# Patient Record
Sex: MALE | Race: WHITE | NOT HISPANIC OR LATINO | Employment: FULL TIME | URBAN - METROPOLITAN AREA
[De-identification: names, ages, dates, MRNs, and addresses within clinical notes are randomized per-mention and may not be internally consistent; named-entity substitution may affect disease eponyms.]

---

## 2018-03-03 ENCOUNTER — HOSPITAL ENCOUNTER (EMERGENCY)
Facility: CLINIC | Age: 48
Discharge: HOME | End: 2018-03-03
Attending: EMERGENCY MEDICINE | Admitting: EMERGENCY MEDICINE
Payer: COMMERCIAL

## 2018-03-03 ENCOUNTER — APPOINTMENT (OUTPATIENT)
Dept: GENERAL RADIOLOGY | Facility: CLINIC | Age: 48
End: 2018-03-03
Attending: EMERGENCY MEDICINE
Payer: COMMERCIAL

## 2018-03-03 VITALS
SYSTOLIC BLOOD PRESSURE: 135 MMHG | HEART RATE: 90 BPM | DIASTOLIC BLOOD PRESSURE: 84 MMHG | OXYGEN SATURATION: 95 % | RESPIRATION RATE: 16 BRPM | HEIGHT: 75 IN | TEMPERATURE: 97.7 F

## 2018-03-03 DIAGNOSIS — M25.511 ACUTE PAIN OF RIGHT SHOULDER: ICD-10-CM
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
## 2018-03-03 PROCEDURE — 96374 THER/PROPH/DIAG INJ IV PUSH: CPT

## 2018-03-03 PROCEDURE — 96375 TX/PRO/DX INJ NEW DRUG ADDON: CPT

## 2018-03-03 PROCEDURE — 99284 EMERGENCY DEPT VISIT MOD MDM: CPT | Mod: 25

## 2018-03-03 PROCEDURE — 73030 X-RAY EXAM OF SHOULDER: CPT | Mod: RT

## 2018-03-03 PROCEDURE — 99284 EMERGENCY DEPT VISIT MOD MDM: CPT | Mod: Z6 | Performed by: EMERGENCY MEDICINE

## 2018-03-03 PROCEDURE — 25000128 H RX IP 250 OP 636: Performed by: EMERGENCY MEDICINE

## 2018-03-03 RX ORDER — HYDROMORPHONE HYDROCHLORIDE 1 MG/ML
0.5 INJECTION, SOLUTION INTRAMUSCULAR; INTRAVENOUS; SUBCUTANEOUS EVERY 30 MIN PRN
Status: DISCONTINUED | OUTPATIENT
Start: 2018-03-03 | End: 2018-03-04 | Stop reason: HOSPADM

## 2018-03-03 RX ORDER — HYDROCODONE BITARTRATE AND ACETAMINOPHEN 5; 325 MG/1; MG/1
1 TABLET ORAL EVERY 4 HOURS PRN
Qty: 7 TABLET | Refills: 0 | Status: SHIPPED | OUTPATIENT
Start: 2018-03-03 | End: 2018-06-27

## 2018-03-03 RX ORDER — ONDANSETRON 2 MG/ML
4 INJECTION INTRAMUSCULAR; INTRAVENOUS
Status: COMPLETED | OUTPATIENT
Start: 2018-03-03 | End: 2018-03-03

## 2018-03-03 RX ADMIN — Medication 0.5 MG: at 20:38

## 2018-03-03 RX ADMIN — ONDANSETRON 4 MG: 2 INJECTION INTRAMUSCULAR; INTRAVENOUS at 20:38

## 2018-03-03 ASSESSMENT — ENCOUNTER SYMPTOMS
CARDIOVASCULAR NEGATIVE: 1
RESPIRATORY NEGATIVE: 1
NEUROLOGICAL NEGATIVE: 1
HEMATOLOGIC/LYMPHATIC NEGATIVE: 1
ENDOCRINE NEGATIVE: 1
GASTROINTESTINAL NEGATIVE: 1
CONSTITUTIONAL NEGATIVE: 1
ALLERGIC/IMMUNOLOGIC NEGATIVE: 1
EYES NEGATIVE: 1
PSYCHIATRIC NEGATIVE: 1

## 2018-03-03 NOTE — ED AVS SNAPSHOT
[facility] Emergency Department    [address] [address]    Phone:  [phone]    Fax:  [phone]                                       Hayden Peterson   MRN: [id number]    Department:  [facility] Emergency Department   Date of Visit:  3/3/2018           Patient Information     Date Of Birth          1970        Your diagnoses for this visit were:     Fall, initial encounter tripped on garbage bag at home    Acute pain of right shoulder Suspect shoulder contusion cannot exclude fracture x-rays negative       You were seen by Valente Nunez MD.      Follow-up Information     Follow up with [facility], [facility]    Why:  if persisten pain and discomfort, You may need re-peat XR imaging or additional imaging to exclude a fracture of the scapula (consider CT imaging)    Contact information:    [address] [facility] [address]  [phone]        Discharge References/Attachments     HYDROCODONE BITARTRATE, ACETAMINOPHEN ORAL TABLET (ENGLISH)    UPPER EXTREMITY CONTUSION (ENGLISH)    CONTUSION, SHOULDER (ENGLISH)      24 Hour Appointment Hotline       To make an appointment at any [facility] call [phone] ([phone]). If you don't have a family doctor or clinic, we will help you find one. [facility] clinics are conveniently located to serve the needs of you and your family.          ED Discharge Orders     Shoulder Immobilizer, contoured                    Review of your medicines      START taking        Dose / Directions Last dose taken    HYDROcodone-acetaminophen 5-325 MG per tablet   Commonly known as:  NORCO   Dose:  1 tablet   Quantity:  7 tablet        Take 1 tablet by mouth every 4 hours as needed   Refills:  0                Prescriptions were sent or printed at these locations (1 Prescription)                   Other Prescriptions                Printed at Department/Unit printer (1 of 1)         HYDROcodone-acetaminophen (NORCO) 5-325 MG  "per tablet                Procedures and tests performed during your visit     XR Shoulder Right 2 Views      Orders Needing Specimen Collection     None      Pending Results     No orders found from 3/1/2018 to 3/4/2018.            Pending Culture Results     No orders found from 3/1/2018 to 3/4/2018.            Pending Results Instructions     If you had any lab results that were not finalized at the time of your Discharge, you can call the ED Lab Result RN at [phone]. You will be contacted by this team for any positive Lab results or changes in treatment. The nurses are available 7 days a week from 10A to 6:30P.  You can leave a message 24 hours per day and they will return your call.        Test Results From Your Hospital Stay        3/3/2018  9:44 PM      Narrative     XR SHOULDER 2 VIEW RIGHT   3/3/2018 9:35 PM     HISTORY: Rt Shoulder tenderness;     COMPARISON: None.        Impression     IMPRESSION: No evidence of acute fracture or subluxation. Humeral head  is properly located in the glenoid fossa. Mild acromioclavicular  degenerative changes.    JOYCELYN MIRANDA MD                Thank you for choosing [facility]       Thank you for choosing [facility] for your care. Our goal is always to provide you with excellent care. Hearing back from our patients is one way we can continue to improve our services. Please take a few minutes to complete the written survey that you may receive in the mail after you visit with us. Thank you!        [email or web address] Information     [employer] lets you send messages to your doctor, view your test results, renew your prescriptions, schedule appointments and more. To sign up, go to [email or web address] . Click on \"Log in\" on the left side of the screen, which will take you to the Welcome page. Then click on \"Sign up Now\" on the right side of the page.     You will be asked to enter the access code listed below, as well as some personal information. Please follow the directions to " create your username and password.     Your access code is: [email or web address]  Expires: 2018 10:08 PM     Your access code will  in 90 days. If you need help or a new code, please call your [facility] clinic or [phone].        Care EveryWhere ID     This is your Care EveryWhere ID. This could be used by other organizations to access your [facility] medical records  [id number]        Equal Access to Services     [facility] Center, Memorial CampusBERNADETTE : Hadii nissa chavez hadasho Soomaali, waaxda luqadaha, qaybta kaalmada adeegyada, chante whitley . So [facility] [phone].    ATENCIÓN: Si habla español, tiene a becerra disposición servicios gratuitos de asistencia lingüística. Llame al [phone].    We comply with applicable federal civil rights laws and [place] laws. We do not discriminate on the basis of race, color, national origin, age, disability, sex, sexual orientation, or gender identity.            After Visit Summary       This is your record. Keep this with you and show to your community pharmacist(s) and doctor(s) at your next visit.

## 2018-03-03 NOTE — ED AVS SNAPSHOT
[facility] Emergency Department    [address] [address]    Phone:  [phone]    Fax:  [phone]                                       Hayden Peterson   MRN: [id number]    Department:  [facility] Emergency Department   Date of Visit:  3/3/2018           After Visit Summary Signature Page     I have received my discharge instructions, and my questions have been answered. I have discussed any challenges I see with this plan with the nurse or doctor.    ..........................................................................................................................................  Patient/Patient Representative Signature      ..........................................................................................................................................  Patient Representative Print Name and Relationship to Patient    ..................................................               ................................................  Date                                            Time    ..........................................................................................................................................  Reviewed by Signature/Title    ...................................................              ..............................................  Date                                                            Time

## 2018-03-04 NOTE — ED PROVIDER NOTES
History     Chief Complaint   Patient presents with     Shoulder Injury     HPI  Hayden Peterson is a 47 year old male who presents the ED with right shoulder pain after a fall. The patient reports he tripped on a trash can when walking outside of his house and fell on his right shoulder and elbow. The patient reports taking citalopram 25 MG daily.  Reports no allergies to medications.  Patient reports he did not hit his head.  He also reports he has no neck pain he denies any back pain he has no abdominal pain no hip pain.  No prior history of injury to his shoulder no prior history of surgeries to his shoulder.     Social History: The patient presents to the ED with his wife. They are from [place], [place].     Past Medical History:  Past Medical History:   Diagnosis Date     NO ACTIVE PROBLEMS        Medications:  Current Outpatient Prescriptions   Medication Sig Dispense Refill     HYDROcodone-acetaminophen (NORCO) 5-325 MG per tablet Take 1 tablet by mouth every 4 hours as needed 7 tablet 0       Allergies:  Allergies   Allergen Reactions     No Known Drug Allergies      Problem List:    Patient Active Problem List    Diagnosis Date Noted     CARDIOVASCULAR SCREENING; LDL GOAL LESS THAN 160 10/31/2010     Priority: Medium     NO ACTIVE PROBLEMS 05/19/2004     Priority: Medium        Past Medical History:    Past Medical History:   Diagnosis Date     NO ACTIVE PROBLEMS        Past Surgical History:    Past Surgical History:   Procedure Laterality Date     NO HISTORY OF SURGERY         Family History:    Family History   Problem Relation Age of Onset     Family History Negative         Social History:  Marital Status:   [2]  Social History   Substance Use Topics     Smoking status: Never Smoker     Smokeless tobacco: Current User     Alcohol use 2.0 oz/week      Comment: occ.        Medications:      HYDROcodone-acetaminophen (NORCO) 5-325 MG per tablet         Review of Systems   Constitutional: Negative.   "  HENT: Negative.    Eyes: Negative.    Respiratory: Negative.    Cardiovascular: Negative.    Gastrointestinal: Negative.    Endocrine: Negative.    Genitourinary: Negative.    Musculoskeletal:        Right shoulder pain and discomfort.   Skin: Negative.    Allergic/Immunologic: Negative.    Neurological: Negative.    Hematological: Negative.    Psychiatric/Behavioral: Negative.    All other systems reviewed and are negative.      Physical Exam   BP: (!) 147/111  Pulse: 90  Temp: 97.7  F (36.5  C)  Resp: 16  Height: 190.5 cm (6' 3\")  SpO2: 98 %      Physical Exam   Constitutional: He is oriented to person, place, and time. He appears well-developed and well-nourished. No distress.   HENT:   Head: Normocephalic and atraumatic.   Eyes: Conjunctivae and EOM are normal. Pupils are equal, round, and reactive to light. Right eye exhibits no discharge. Left eye exhibits no discharge. No scleral icterus.   Neck: Normal range of motion. Neck supple. No JVD present. No tracheal deviation present. No thyromegaly present.   Cardiovascular: Normal rate and regular rhythm.  Exam reveals no gallop and no friction rub.    No murmur heard.  Pulmonary/Chest: Effort normal and breath sounds normal. No stridor.   Abdominal: Soft.   Musculoskeletal: He exhibits tenderness.        Right shoulder: He exhibits decreased range of motion, tenderness, bony tenderness and pain. He exhibits no swelling, no effusion, no crepitus, no deformity, no spasm, normal pulse and normal strength.        Arms:  Lymphadenopathy:     He has no cervical adenopathy.   Neurological: He is alert and oriented to person, place, and time. No cranial nerve deficit. Coordination normal.   Skin: No rash noted. He is not diaphoretic. No erythema. No pallor.   Psychiatric: He has a normal mood and affect. His behavior is normal. Judgment and thought content normal.       ED Course     ED Course     Procedures               Critical Care time:            Labs Ordered " and Resulted from Time of ED Arrival Up to the Time of Departure from the ED - No data to display          ED Medications:  Medications   HYDROmorphone (PF) (DILAUDID) injection 0.5 mg (0.5 mg Intravenous Given 3/3/18 2038)   ondansetron (ZOFRAN) injection 4 mg (4 mg Intravenous Given 3/3/18 2038)       ED Vitals:  Vitals:    03/03/18 2042 03/03/18 2045 03/03/18 2100 03/03/18 2115   BP: 135/84      Pulse:       Resp:       Temp:       TempSrc:       SpO2: 96% 96% 96% 95%   Height:           ED Labs and Imaging:  Results for orders placed or performed during the hospital encounter of 03/03/18 (from the past 24 hour(s))   XR Shoulder Right 2 Views    Narrative    XR SHOULDER 2 VIEW RIGHT   3/3/2018 9:35 PM     HISTORY: Rt Shoulder tenderness;     COMPARISON: None.      Impression    IMPRESSION: No evidence of acute fracture or subluxation. Humeral head  is properly located in the glenoid fossa. Mild acromioclavicular  degenerative changes.    JOYCELYN MIRANDA MD     8:16 PM    ED medications:  Medications   HYDROmorphone (PF) (DILAUDID) injection 0.5 mg (0.5 mg Intravenous Given 3/3/18 2038)   ondansetron (ZOFRAN) injection 4 mg (4 mg Intravenous Given 3/3/18 2038)         ED Vitals:  Vitals:    03/03/18 2042 03/03/18 2045 03/03/18 2100 03/03/18 2115   BP: 135/84      Pulse:       Resp:       Temp:       TempSrc:       SpO2: 96% 96% 96% 95%   Height:         Assessments & Plan (with Medical Decision Making)   Clinical impression: 47-year-old male right-hand-dominant who presented for right shoulder injury after mechanical fall at his home.  As a shoulder contusion x-ray did not show any fractures however advanced imaging may be needed to exclude any subtle scapular fracture injury.   He reports his wife had garbage out in the landing when he lost his footing and fell onto his right shoulder.  Patient arrived about half an hour after his mechanical fall.  He is seen on arrival for concern for shoulder injury in the context  of blunt trauma.  He reports no allergies to medicines and takes citalopram.  He works as a gun salesman.  On exam he has reproducible pain and discomfort to palpation about the posterior shoulder over the scapula without any gross deformity. He has limited range of motion with internal and external rotation of his right shoulder. There is no bruising about the trunk or chest wall.  No neck pain and no head injury. GCS is 15    ED course and plan:  With mechanism of injury and to aid comfort IV was established was given IV Dilaudid and Zofran.  An x-ray of his right shoulder was obtained.  X-ray was reviewed independently.  Please see the interpreting radiologist report above.  Patient was reevaluated after pain control x-ray imaging.  He continued have reproducible pain over the AC joint and upper pole of his right scapula.  We discussed that he could have a scapula fracture that could be missed on x-ray.  He was willing to try supportive care and expectant care with immobilization ice the shoulder and taking Vicodin for pain over the next 2-3 days.  If he develops persistent pain or discomfort he may need to have CT imaging to exclude a fracture.  He did not have any paresthesias or extremity weakness and did not complain of any neck pain and I felt it was reasonable to discharge him home with supportive care without advanced imaging.  Patient was comfortable plan of care        Disclaimer: This note consists of symbols derived from keyboarding, dictation and/or voice recognition software. As a result, there may be errors in the script that have gone undetected. Please consider this when interpreting information found in this chart.  I have reviewed the nursing notes.    I have reviewed the findings, diagnosis, plan and need for follow up with the patient.     Discharge Medication List as of 3/3/2018 10:08 PM      START taking these medications    Details   HYDROcodone-acetaminophen (NORCO) 5-325 MG per tablet  Take 1 tablet by mouth every 4 hours as needed, Disp-7 tablet, R-0, Local Print             Final diagnoses:   Fall, initial encounter - tripped on garbage bag at home   Acute pain of right shoulder - Suspect shoulder contusion cannot exclude fracture x-rays negative   This document serves as a record of the services and decisions personally performed and made by Valente Nunez, *.The HPI was created on his behalf by   Cary Whitfield, a trained medical scribe. The creation of this document is based the provider's statements to the medical scribe.  Cary Whitfield 8:17 PM 3/3/2018    Provider:   The information in this document, created by the medical scribe for me, accurately reflects the services I personally performed and the decisions made by me. I have reviewed and approved this document for accuracy prior to leaving the patient care area.  Valente Nunez, * 8:17 PM 3/3/2018      3/3/2018   [facility] EMERGENCY DEPARTMENT     Valente Nunez MD  03/03/18 2637

## 2018-06-27 ENCOUNTER — HOSPITAL ENCOUNTER (EMERGENCY)
Facility: CLINIC | Age: 48
Discharge: HOME | End: 2018-06-27
Attending: EMERGENCY MEDICINE | Admitting: EMERGENCY MEDICINE
Payer: COMMERCIAL

## 2018-06-27 VITALS
SYSTOLIC BLOOD PRESSURE: 161 MMHG | HEIGHT: 76 IN | RESPIRATION RATE: 18 BRPM | BODY MASS INDEX: 33.49 KG/M2 | TEMPERATURE: 100 F | OXYGEN SATURATION: 97 % | HEART RATE: 99 BPM | WEIGHT: 275 LBS | DIASTOLIC BLOOD PRESSURE: 104 MMHG

## 2018-06-27 DIAGNOSIS — J02.0 ACUTE STREPTOCOCCAL PHARYNGITIS: ICD-10-CM

## 2018-06-27 LAB
DEPRECATED S PYO AG THROAT QL EIA: ABNORMAL
SPECIMEN SOURCE: ABNORMAL

## 2018-06-27 PROCEDURE — 99284 EMERGENCY DEPT VISIT MOD MDM: CPT | Mod: Z6 | Performed by: EMERGENCY MEDICINE

## 2018-06-27 PROCEDURE — 87880 STREP A ASSAY W/OPTIC: CPT | Performed by: EMERGENCY MEDICINE

## 2018-06-27 PROCEDURE — 99283 EMERGENCY DEPT VISIT LOW MDM: CPT | Performed by: EMERGENCY MEDICINE

## 2018-06-27 RX ORDER — AMOXICILLIN 500 MG/1
1000 CAPSULE ORAL 2 TIMES DAILY
Qty: 40 CAPSULE | Refills: 0 | Status: SHIPPED | OUTPATIENT
Start: 2018-06-27 | End: 2018-07-07

## 2018-06-27 NOTE — ED AVS SNAPSHOT
[facility] Emergency Department    [address] [address]    Phone:  [phone]    Fax:  [phone]                                       Hayden Peterson   MRN: [id number]    Department:  [facility] Emergency Department   Date of Visit:  6/27/2018           After Visit Summary Signature Page     I have received my discharge instructions, and my questions have been answered. I have discussed any challenges I see with this plan with the nurse or doctor.    ..........................................................................................................................................  Patient/Patient Representative Signature      ..........................................................................................................................................  Patient Representative Print Name and Relationship to Patient    ..................................................               ................................................  Date                                            Time    ..........................................................................................................................................  Reviewed by Signature/Title    ...................................................              ..............................................  Date                                                            Time

## 2018-06-27 NOTE — ED AVS SNAPSHOT
[facility] Emergency Department    [address] [address]    Phone:  [phone]    Fax:  [phone]                                       Hayden Peterson   MRN: [id number]    Department:  [facility] Emergency Department   Date of Visit:  6/27/2018           Patient Information     Date Of Birth          1970        Your diagnoses for this visit were:     Acute streptococcal pharyngitis        You were seen by Yrn Aguayo MD.      Follow-up Information     Follow up with [facility]    Why:  As needed    [facility]:    [address] [id number] [phone]        Discharge References/Attachments     PHARYNGITIS, STREP (CONFIRMED) (ENGLISH)      24 Hour Appointment Hotline       To make an appointment at any [facility], call [phone] ([phone]). If you don't have a family doctor or clinic, we will help you find one. [facility] clinics are conveniently located to serve the needs of you and your family.             Review of your medicines      START taking        Dose / Directions Last dose taken    amoxicillin 500 MG capsule   Commonly known as:  AMOXIL   Dose:  1000 mg   Quantity:  40 capsule        Take 2 capsules (1,000 mg) by mouth 2 times daily for 10 days   Refills:  0          Our records show that you are taking the medicines listed below. If these are incorrect, please call your family doctor or clinic.        Dose / Directions Last dose taken    CITALOPRAM HYDROBROMIDE PO   Dose:  20 mg        Take 20 mg by mouth daily   Refills:  0                Prescriptions were sent or printed at these locations (1 Prescription)                   [facility] [address]   [address] [address]    Telephone:  [phone]   Fax:  [phone]   Hours:                  E-Prescribed (1 of 1)         amoxicillin (AMOXIL) 500 MG capsule                Procedures and tests performed  "during your visit     Rapid Strep Screen      Orders Needing Specimen Collection     None      Pending Results     No orders found from 2018 to 2018.            Pending Culture Results     No orders found from 2018 to 2018.            Pending Results Instructions     If you had any lab results that were not finalized at the time of your Discharge, you can call the ED Lab Result RN at [phone]. You will be contacted by this team for any positive Lab results or changes in treatment. The nurses are available 7 days a week from 10A to 6:30P.  You can leave a message 24 hours per day and they will return your call.        Test Results From Your Hospital Stay        2018  7:39 PM      Component Results     Component    Specimen Description    Throat    Rapid Strep A Screen (Abnormal)    POSITIVE: Group A Streptococcal antigen detected by immunoassay.                Thank you for choosing [facility]       Thank you for choosing [facility] for your care. Our goal is always to provide you with excellent care. Hearing back from our patients is one way we can continue to improve our services. Please take a few minutes to complete the written survey that you may receive in the mail after you visit with us. Thank you!        [employer] Information     [employer] lets you send messages to your doctor, view your test results, renew your prescriptions, schedule appointments and more. To sign up, go to [email or web address] . Click on \"Log in\" on the left side of the screen, which will take you to the Welcome page. Then click on \"Sign up Now\" on the right side of the page.     You will be asked to enter the access code listed below, as well as some personal information. Please follow the directions to create your username and password.     Your access code is: [email or web address]  Expires: 2018  7:48 PM     Your access code will  in 90 days. If you need help or a new code, please call your [facility] clinic or " [phone].        Care EveryWhere ID     This is your Care EveryWhere ID. This could be used by other organizations to access your [facility] medical records  [id number]        Equal Access to Services     KATHARINA MERA : Jen Wade, sofía botello, qaluz maria kazahidamadelaine king, chante horton. So [facility] [phone].    ATENCIÓN: Si habla español, tiene a becerra disposición servicios gratuitos de asistencia lingüística. Llame al [phone].    We comply with applicable federal civil rights laws and [place] laws. We do not discriminate on the basis of race, color, national origin, age, disability, sex, sexual orientation, or gender identity.            After Visit Summary       This is your record. Keep this with you and show to your community pharmacist(s) and doctor(s) at your next visit.

## 2018-06-28 NOTE — ED NOTES
"Pt c/o sore throat last few days and today also felt \"dizzy and almost buzzed feeling.\"  Pt states he cut out caffeine 2 days ago and feels that has something to do with the way he is feeling.  Pt used to have 4-5 cokes and 2-3 40 oz coffees a day.  Pt also c/o ha.  Pt did have a coke around 2:30 today that helped with the symptoms.  Pt denies any other symptoms.  Pt A&O x3.  = and strong in all extremities.  "

## 2018-06-28 NOTE — ED PROVIDER NOTES
"  History     Chief Complaint   Patient presents with     Dizziness     started today, c/o HA. states thinking feels foggy. c/o sore throat also     HPI  Hayden Peterson is a 47 year old male who presents with complaints of headache, a foggy sensation, dizziness which he describes as a \"buzzed sensation\".  He states he has not been drinking alcohol however.  He is also complained of a mild left-sided sore throat but has no problems with speech or swallowing.  Low-grade temperature here in triage but patient denies fever or chills at home.  Patient admits he stopped drinking caffeine 2 days ago.  Prior to that he would have at least 6 caffeinated beverages daily.  He states yesterday was not bad but today he had a pretty significant headache and had trouble concentrating.  After he drank two [employer]'s symptoms have improved and almost resolved.  Denies history of vertigo.  No history of TIA or stroke.  Denies any focal motor weakness or new sensory changes.  He has 3 children but is unsure if he has been exposed to strep.  He has had no skin rashes.  No abdominal complaints, nausea or vomiting.  No diarrhea or dysuria.  Other than the caffeinated beverages no treatment prior to arrival.  Patient does chew tobacco.  Patient does have seasonal allergies but does not take any medicine for this.    Problem List:    Patient Active Problem List    Diagnosis Date Noted     CARDIOVASCULAR SCREENING; LDL GOAL LESS THAN 160 10/31/2010     Priority: Medium     NO ACTIVE PROBLEMS 05/19/2004     Priority: Medium        Past Medical History:    Past Medical History:   Diagnosis Date     NO ACTIVE PROBLEMS        Past Surgical History:    Past Surgical History:   Procedure Laterality Date     NO HISTORY OF SURGERY         Family History:    Family History   Problem Relation Age of Onset     Family History Negative         Social History:  Marital Status:   [2]  Social History   Substance Use Topics     Smoking status: " "Never Smoker     Smokeless tobacco: Current User     Alcohol use 2.0 oz/week      Comment: occ.        Medications:      amoxicillin (AMOXIL) 500 MG capsule   CITALOPRAM HYDROBROMIDE PO         Review of Systems all other systems reviewed and are negative.    Physical Exam   BP: 147/82  Pulse: 99  Temp: 100  F (37.8  C)  Resp: 18  Height: 193 cm (6' 4\")  Weight: 124.7 kg (275 lb)  SpO2: 98 %      Physical Exam alert cooperative male in mild distress.  HEENT shows no facial asymmetry or droop.  Eyes show pupils equal reactive.  Extraocular motions are intact.  Right TM is partially occluded by cerumen but appears normal.  The left is normal.  Hearing appears normal and equal.  Nasal congestion without discharge.  Orally the tonsils are enlarged with left being slightly greater than the right.  Exudate on the left.  Neck is supple without meningismus.  Tender anterior nodes but no posterior nodes.  No stridor.  Lungs were clear without adventitious sounds.  Cardiac regular rate without murmur.  No skin rashes are appreciated.  Neurologically nonfocal exam    ED Course     ED Course     Procedures               Critical Care time:  none               Results for orders placed or performed during the hospital encounter of 06/27/18 (from the past 24 hour(s))   Rapid Strep Screen   Result Value Ref Range    Specimen Description Throat     Rapid Strep A Screen (A)      POSITIVE: Group A Streptococcal antigen detected by immunoassay.       Medications - No data to display  Rapid strep was positive  Assessments & Plan (with Medical Decision Making)   Patient is a 47 year old male presents with complaints of headache, dizziness, sore throat and noted to have fever in triage. No exposure to strep but does have 3 children.  No history of vertigo or TIA.  Quit drinking caffeine 2 days ago and this may be contributing as had improvement in his symptoms after he drank to [employer]'s.  Does have mild sore throat but is able to " handle secretions and speak in complete sentences.  Did have tonsillar hypertrophy with exudate in the left.  Tender anterior nodes.  Positive strep test. Normal cardiac and respiratory auscultation.  No skin rashes.  Patient is given information on strep pharyngitis.  Amoxicillin as directed for 10 days.  If he does want to stop drinking caffeine he should slowly wean off it and not stop it abruptly.  Reasons to return for reassessment discussed.  I have reviewed the nursing notes.    I have reviewed the findings, diagnosis, plan and need for follow up with the patient.       New Prescriptions    AMOXICILLIN (AMOXIL) 500 MG CAPSULE    Take 2 capsules (1,000 mg) by mouth 2 times daily for 10 days       Final diagnoses:   Acute streptococcal pharyngitis       6/27/2018   [facility] EMERGENCY DEPARTMENT     Yrn Aguayo MD  06/27/18 1946

## 2020-06-29 ENCOUNTER — HOSPITAL ENCOUNTER (EMERGENCY)
Facility: CLINIC | Age: 50
Discharge: HOME | End: 2020-06-29
Attending: EMERGENCY MEDICINE | Admitting: EMERGENCY MEDICINE
Payer: COMMERCIAL

## 2020-06-29 VITALS
TEMPERATURE: 98.9 F | BODY MASS INDEX: 32.88 KG/M2 | RESPIRATION RATE: 16 BRPM | OXYGEN SATURATION: 98 % | SYSTOLIC BLOOD PRESSURE: 156 MMHG | WEIGHT: 270 LBS | DIASTOLIC BLOOD PRESSURE: 104 MMHG | HEIGHT: 76 IN

## 2020-06-29 DIAGNOSIS — H61.21 IMPACTED CERUMEN OF RIGHT EAR: ICD-10-CM

## 2020-06-29 DIAGNOSIS — H60.91 OTITIS EXTERNA OF RIGHT EAR, UNSPECIFIED CHRONICITY, UNSPECIFIED TYPE: ICD-10-CM

## 2020-06-29 PROCEDURE — 99283 EMERGENCY DEPT VISIT LOW MDM: CPT | Mod: 25 | Performed by: EMERGENCY MEDICINE

## 2020-06-29 PROCEDURE — 99281 EMR DPT VST MAYX REQ PHY/QHP: CPT

## 2020-06-29 PROCEDURE — 99284 EMERGENCY DEPT VISIT MOD MDM: CPT | Mod: 25 | Performed by: EMERGENCY MEDICINE

## 2020-06-29 PROCEDURE — 69210 REMOVE IMPACTED EAR WAX UNI: CPT | Mod: RT | Performed by: EMERGENCY MEDICINE

## 2020-06-29 RX ORDER — CIPROFLOXACIN AND DEXAMETHASONE 3; 1 MG/ML; MG/ML
4 SUSPENSION/ DROPS AURICULAR (OTIC) 2 TIMES DAILY
Qty: 1 BOTTLE | Refills: 0 | Status: SHIPPED | OUTPATIENT
Start: 2020-06-29 | End: 2020-07-06

## 2020-06-29 ASSESSMENT — MIFFLIN-ST. JEOR: SCORE: 2201.21

## 2020-06-29 NOTE — ED AVS SNAPSHOT
[facility] Emergency Department  [address] [address]  Phone:  [phone]  Fax:  [phone]                                    Hayden Peterson   MRN: [id number]    Department:  [facility] Emergency Department   Date of Visit:  6/29/2020           After Visit Summary Signature Page    I have received my discharge instructions, and my questions have been answered. I have discussed any challenges I see with this plan with the nurse or doctor.    ..........................................................................................................................................  Patient/Patient Representative Signature      ..........................................................................................................................................  Patient Representative Print Name and Relationship to Patient    ..................................................               ................................................  Date                                   Time    ..........................................................................................................................................  Reviewed by Signature/Title    ...................................................              ..............................................  Date                                               Time          22EPIC Rev 08/18

## 2020-06-30 NOTE — ED PROVIDER NOTES
"  History     Chief Complaint   Patient presents with     Otalgia     rt ear plugged     HPI  Hayden Peterson is a 49 year old male who developed right ear fullness, plugged sensation, decreased hearing and mild discomfort 2 days ago after swimming.  No ear redness, swelling or drainage.  No URI symptoms.  No other acute complaints or concerns.  He is not diabetic.    Allergies:  Allergies   Allergen Reactions     No Known Drug Allergies        Problem List:    Patient Active Problem List    Diagnosis Date Noted     CARDIOVASCULAR SCREENING; LDL GOAL LESS THAN 160 10/31/2010     Priority: Medium     NO ACTIVE PROBLEMS 05/19/2004     Priority: Medium        Past Medical History:    Past Medical History:   Diagnosis Date     NO ACTIVE PROBLEMS        Past Surgical History:    Past Surgical History:   Procedure Laterality Date     NO HISTORY OF SURGERY         Family History:    Family History   Problem Relation Age of Onset     Family History Negative Unknown        Social History:  Marital Status:   [2]  Social History     Tobacco Use     Smoking status: Never Smoker     Smokeless tobacco: Current User   Substance Use Topics     Alcohol use: Yes     Alcohol/week: 3.3 standard drinks     Comment: occ.     Drug use: No     Comment: no herbal meds either        Medications:    ciprofloxacin-dexamethasone (CIPRODEX) 0.3-0.1 % otic suspension  CITALOPRAM HYDROBROMIDE PO          Review of Systems   Constitutional: Negative.    HENT: Positive for hearing loss. Negative for ear discharge and ear pain.    Respiratory: Negative.    Skin: Negative.        Physical Exam   BP: (!) 156/104  Heart Rate: 83  Temp: 98.9  F (37.2  C)  Resp: 16  Height: 193 cm (6' 4\")  Weight: 122.5 kg (270 lb)  SpO2: 98 %      Physical Exam  Vitals signs and nursing note reviewed.   Constitutional:       General: He is not in acute distress.     Appearance: Normal appearance. He is not ill-appearing.   HENT:      Head: Normocephalic and " atraumatic.      Right Ear: There is impacted cerumen.      Left Ear: Tympanic membrane, ear canal and external ear normal.      Ears:      Comments: Right cerumen impaction.  Reexaminations during earwax removal, and after earwax removal revealed inflamed mildly edematous external canal.  TM is dull with decreased landmarks.     Nose: Nose normal.      Mouth/Throat:      Mouth: Mucous membranes are moist.   Eyes:      Extraocular Movements: Extraocular movements intact.      Conjunctiva/sclera: Conjunctivae normal.   Pulmonary:      Effort: Pulmonary effort is normal. No respiratory distress.   Skin:     General: Skin is warm and dry.      Coloration: Skin is not pale.      Findings: No erythema.   Neurological:      Mental Status: He is alert.   Psychiatric:         Mood and Affect: Mood normal.         Behavior: Behavior normal.         ED Course        [facility]    Foreign Body Removal - Orifice  Performed by: Nam Knowles MD  Authorized by: Nam Knowles MD       LOCATION      Location:  Ear    Ear location:  R ear    PRE PROCEDURE DETAILS     Imaging:  None    ANESTHESIA (see MAR for exact dosages):     Topical anesthetic:  None    PROCEDURE DETAILS      Localization method:  Direct visualization    Removal mechanism:  Curette and irrigation    Procedure complexity:  Complex    POST PROCEDURE DETAILS      Confirmation:  No additional foreign bodies on visualization    PROCEDURE   Patient Tolerance:  Patient tolerated the procedure well with no immediate complications  Describe Procedure: Cerumen impaction from the right ear was initially incompletely removed with a curette. Irrigation was performed.  Then repeated manual disimpaction with a curette, but again incompletely. The ear was then reirrigated.  Again the cerumen impaction was not completely removed and I then was able to remove the rest of the loosen cerumen with a curette.  3 manual procedures with a curette and  to irrigation procedures by the emergency room technician.                 No results found for this or any previous visit (from the past 24 hour(s)).    Medications - No data to display    Assessments & Plan (with Medical Decision Making)   Right ear cerumen impaction with inflamed mildly edematous canal, due to cerumen impaction water retention after swimming recently.  Cerumen was disimpacted with multiple curetting and irrigations, without complication.  Rx Ciprodex otic drops. Patient was provided instructions for supportive care and will return as needed for worsened condition or worsening symptoms, or new problems or concerns.    I have reviewed the nursing notes.    I have reviewed the findings, diagnosis, plan and need for follow up with the patient.    New Prescriptions    CIPROFLOXACIN-DEXAMETHASONE (CIPRODEX) 0.3-0.1 % OTIC SUSPENSION    Place 4 drops into the right ear 2 times daily for 7 days       Final diagnoses:   Impacted cerumen of right ear   Otitis externa of right ear, unspecified chronicity, unspecified type       6/29/2020   [facility] EMERGENCY DEPARTMENT     Nam Knowles MD  07/03/20 0702

## 2020-07-03 ASSESSMENT — ENCOUNTER SYMPTOMS
CONSTITUTIONAL NEGATIVE: 1
RESPIRATORY NEGATIVE: 1

## 2024-07-13 ENCOUNTER — HOSPITAL ENCOUNTER (EMERGENCY)
Facility: CLINIC | Age: 54
Discharge: HOME | End: 2024-07-13
Attending: EMERGENCY MEDICINE | Admitting: EMERGENCY MEDICINE
Payer: COMMERCIAL

## 2024-07-13 ENCOUNTER — APPOINTMENT (OUTPATIENT)
Dept: GENERAL RADIOLOGY | Facility: CLINIC | Age: 54
End: 2024-07-13
Attending: EMERGENCY MEDICINE
Payer: COMMERCIAL

## 2024-07-13 VITALS
OXYGEN SATURATION: 97 % | HEIGHT: 76 IN | TEMPERATURE: 99.1 F | HEART RATE: 79 BPM | DIASTOLIC BLOOD PRESSURE: 87 MMHG | BODY MASS INDEX: 34.7 KG/M2 | SYSTOLIC BLOOD PRESSURE: 131 MMHG | RESPIRATION RATE: 18 BRPM | WEIGHT: 285 LBS

## 2024-07-13 DIAGNOSIS — M54.9 ACUTE RIGHT-SIDED BACK PAIN, UNSPECIFIED BACK LOCATION: ICD-10-CM

## 2024-07-13 DIAGNOSIS — T14.8XXA ABRASION: ICD-10-CM

## 2024-07-13 PROCEDURE — 71101 X-RAY EXAM UNILAT RIBS/CHEST: CPT | Mod: RT

## 2024-07-13 PROCEDURE — 99284 EMERGENCY DEPT VISIT MOD MDM: CPT | Performed by: EMERGENCY MEDICINE

## 2024-07-13 PROCEDURE — 72100 X-RAY EXAM L-S SPINE 2/3 VWS: CPT

## 2024-07-13 ASSESSMENT — COLUMBIA-SUICIDE SEVERITY RATING SCALE - C-SSRS
1. IN THE PAST MONTH, HAVE YOU WISHED YOU WERE DEAD OR WISHED YOU COULD GO TO SLEEP AND NOT WAKE UP?: NO
2. HAVE YOU ACTUALLY HAD ANY THOUGHTS OF KILLING YOURSELF IN THE PAST MONTH?: NO
6. HAVE YOU EVER DONE ANYTHING, STARTED TO DO ANYTHING, OR PREPARED TO DO ANYTHING TO END YOUR LIFE?: NO

## 2024-07-13 ASSESSMENT — ACTIVITIES OF DAILY LIVING (ADL)
ADLS_ACUITY_SCORE: 35
ADLS_ACUITY_SCORE: 35

## 2024-07-14 NOTE — ED TRIAGE NOTES
Patient slipped and fell on his pool steps at 1:30pm today. No loss of consciousness. Did not hit head. Is not on blood thinners. Reports right lower back pain and right shoulder blade pain. Pain is worse with movement. Abrasions noted on right lower back and right shoulder.     Triage Assessment (Adult)       Row Name 07/13/24 3799          Triage Assessment    Airway WDL WDL        Respiratory WDL    Respiratory WDL WDL        Skin Circulation/Temperature WDL    Skin Circulation/Temperature WDL WDL        Cardiac WDL    Cardiac WDL WDL        Peripheral/Neurovascular WDL    Peripheral Neurovascular WDL WDL        Cognitive/Neuro/Behavioral WDL    Cognitive/Neuro/Behavioral WDL WDL

## 2024-07-14 NOTE — DISCHARGE INSTRUCTIONS
Follow-up in clinic as needed.    Tylenol and ibuprofen as needed for pain.    X-rays looked okay with no evidence of broken bone.

## 2024-07-14 NOTE — ED PROVIDER NOTES
ED Provider Note  [facility]      History     Chief Complaint   Patient presents with    Fall    Back Pain     HPI  Hayden Peterson is a 53 year old male who presents to the emergency department with concerns regarding low back pain in addition to right posterior chest wall pain in the context of a fall which occurred earlier this afternoon, about 8 hours prior to ED arrival.  Patient states he was walking down pool stairs, which were wooden, and patient subsequently slipped, landing on his low back, and right upper back.  Pain has progressed throughout the afternoon and evening hours, somewhat relieved with ibuprofen.  No severe shortness of breath.  No prior low back surgeries.        Independent Historian:        Review of External Notes:          Allergies:  Allergies   Allergen Reactions    No Known Drug Allergy        Problem List:    Patient Active Problem List    Diagnosis Date Noted    CARDIOVASCULAR SCREENING; LDL GOAL LESS THAN 160 10/31/2010     Priority: Medium    NO ACTIVE PROBLEMS 05/19/2004     Priority: Medium        Past Medical History:    Past Medical History:   Diagnosis Date    NO ACTIVE PROBLEMS        Past Surgical History:    Past Surgical History:   Procedure Laterality Date    NO HISTORY OF SURGERY         Family History:    Family History   Problem Relation Age of Onset    Family History Negative Other        Social History:  Marital Status:   [2]  Social History     Tobacco Use    Smoking status: Never    Smokeless tobacco: Current   Substance Use Topics    Alcohol use: Yes     Alcohol/week: 3.3 standard drinks of alcohol     Comment: occ.    Drug use: No     Comment: no herbal meds either        Medications:    CITALOPRAM HYDROBROMIDE PO          Review of Systems  A medically appropriate review of systems was performed with pertinent positives and negatives noted in the HPI, and all other systems negative.    Physical Exam   Patient Vitals for the  "past 24 hrs:   BP Temp Temp src Pulse Resp SpO2 Height Weight   07/13/24 2300 131/87 -- -- 79 18 97 % -- --   07/13/24 2229 126/79 -- -- 87 -- -- -- --   07/13/24 2224 -- -- -- -- -- 96 % -- --   07/13/24 2207 123/84 99.1  F (37.3  C) Oral 91 18 96 % 1.93 m (6' 4\") 129.3 kg (285 lb)          Physical Exam  General: alert and in no  acute distress on arrival  Head: atraumatic, normocephalic  Lungs:  nonlabored  CV:  extremities warm and perfused  Abd: nondistended  back: Abrasion which is linear over the right lateral aspect of the back.  Also additional abrasion over the lower midline lumbar spine area.  Tender over the areas of abrasions, with some tenderness over the right lower costal margin  Skin: no rashes, no diaphoresis and skin color normal  Neuro: Patient awake, alert, speech is fluent,   Psychiatric: affect/mood normal,        ED Course                 Procedures                           Results for orders placed or performed during the hospital encounter of 07/13/24 (from the past 24 hour(s))   Ribs XR, unilat 3 views + PA chest, right    Narrative    EXAM: XR RIBS and CHEST RT 3VW  LOCATION: [facility]  DATE: 7/13/2024    INDICATION: fall with pain.  right posterior ribs  COMPARISON: Frontal chest and 4 views of the right ribs. The heart size is normal. The lungs are clear. There is no pneumothorax. No acute rib fracture. Degenerative disease in the spine.      Impression    IMPRESSION: No acute abnormality. No rib fractures.   Lumbar spine XR, 2-3 views    Narrative    EXAM: XR LUMBAR SPINE 2/3 VIEWS  LOCATION: [facility]  DATE: 7/13/2024    INDICATION: fall with pain  COMPARISON: None.      Impression    IMPRESSION:   Mild multilevel spondylosis. Bilateral SI degenerative joint disease.    No radiographic acute fractures identified.    Vertebral heights maintained. No significant subluxations. Bilateral sacroiliac joints intact.       MEDICATIONS " GIVEN IN THE EMERGENCY DEPARTMENT:  Medications - No data to display        Independent Interpretation (X-rays, CTs, rhythm strip):  X-ray of the lumbar spine and ribs are personally reviewed.  No acute fracture.    Consultations/Discussion of Management or Tests:         Social Determinants of Health affecting care:         Assessments & Plan (with Medical Decision Making)  53 year old male who presents to the Emergency Department for evaluation of low back pain, in addition to right posterior chest wall pain in the context of a fall in number of hours prior to ED arrival on wooden stairs.  There was no hitting his head, no loss consciousness.  Has isolated injury to the right posterior lateral chest wall, in addition to the lower lumbar spine.  X-ray images are performed at these areas, with images personally reviewed in addition to radiology impression showing no evidence of acute posttraumatic abnormality.  Recommended over-the-counter analgesics as needed for pain, and follow-up in clinic if not improving, and return precautions discussed if severe worsening of symptoms develop.       I have reviewed the nursing notes.    I have reviewed the findings, diagnosis, plan and need for follow up with the patient.       Critical Care time:  none      NEW PRESCRIPTIONS STARTED AT TODAY'S ER VISIT  Discharge Medication List as of 7/13/2024 11:34 PM          Final diagnoses:   Acute right-sided back pain, unspecified back location   Abrasion       7/13/2024   [facility] EMERGENCY DEPT       Meliton Polo MD  07/13/24 2555

## 2025-03-04 ENCOUNTER — APPOINTMENT (OUTPATIENT)
Dept: GENERAL RADIOLOGY | Facility: CLINIC | Age: 55
End: 2025-03-04

## 2025-03-04 ENCOUNTER — HOSPITAL ENCOUNTER (EMERGENCY)
Facility: CLINIC | Age: 55
Discharge: HOME | End: 2025-03-04

## 2025-03-04 VITALS
RESPIRATION RATE: 16 BRPM | HEART RATE: 71 BPM | OXYGEN SATURATION: 94 % | SYSTOLIC BLOOD PRESSURE: 142 MMHG | TEMPERATURE: 97.8 F | DIASTOLIC BLOOD PRESSURE: 90 MMHG
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
## 2025-03-04 DIAGNOSIS — M54.50 LOW BACK PAIN: ICD-10-CM

## 2025-03-04 DIAGNOSIS — S16.1XXA STRAIN OF NECK MUSCLE, INITIAL ENCOUNTER: ICD-10-CM

## 2025-03-04 PROCEDURE — 99214 OFFICE O/P EST MOD 30 MIN: CPT

## 2025-03-04 PROCEDURE — G0463 HOSPITAL OUTPT CLINIC VISIT: HCPCS

## 2025-03-04 PROCEDURE — 72100 X-RAY EXAM L-S SPINE 2/3 VWS: CPT

## 2025-03-04 ASSESSMENT — COLUMBIA-SUICIDE SEVERITY RATING SCALE - C-SSRS
2. HAVE YOU ACTUALLY HAD ANY THOUGHTS OF KILLING YOURSELF IN THE PAST MONTH?: NO
1. IN THE PAST MONTH, HAVE YOU WISHED YOU WERE DEAD OR WISHED YOU COULD GO TO SLEEP AND NOT WAKE UP?: NO
6. HAVE YOU EVER DONE ANYTHING, STARTED TO DO ANYTHING, OR PREPARED TO DO ANYTHING TO END YOUR LIFE?: NO

## 2025-03-04 ASSESSMENT — ACTIVITIES OF DAILY LIVING (ADL)
ADLS_ACUITY_SCORE: 41
ADLS_ACUITY_SCORE: 41
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
